# Patient Record
Sex: FEMALE | Race: WHITE | NOT HISPANIC OR LATINO | URBAN - METROPOLITAN AREA
[De-identification: names, ages, dates, MRNs, and addresses within clinical notes are randomized per-mention and may not be internally consistent; named-entity substitution may affect disease eponyms.]

---

## 2021-11-08 ENCOUNTER — *OTHER (OUTPATIENT)
Dept: URBAN - METROPOLITAN AREA CLINIC 24 | Facility: CLINIC | Age: 76
Setting detail: DERMATOLOGY
End: 2021-11-08

## 2021-11-08 ENCOUNTER — RX ONLY (RX ONLY)
Age: 76
End: 2021-11-08

## 2021-11-08 DIAGNOSIS — L70.0 ACNE VULGARIS: ICD-10-CM

## 2021-11-08 PROCEDURE — 99213 OFFICE O/P EST LOW 20 MIN: CPT

## 2021-11-08 RX ORDER — KETOCONAZOLE 20 MG/G
1 LIBERALLY CREAM TOPICAL TWICE A DAY
Qty: 60 | Refills: 1
Start: 2021-11-08

## 2021-11-08 RX ORDER — CICLOPIROX OLAMINE 7.7 MG/100ML
1 A SMALL AMOUNT SUSPENSION TOPICAL AT BEDTIME
Qty: 60 | Refills: 10
Start: 2021-11-08

## 2021-11-18 ENCOUNTER — RX ONLY (RX ONLY)
Age: 76
End: 2021-11-18

## 2021-11-18 RX ORDER — KETOCONAZOLE 20 MG/G
1 AS DIRECTED CREAM TOPICAL AS DIRECTED
Qty: 60 | Refills: 6
Start: 2021-11-18

## 2021-11-22 ENCOUNTER — RX ONLY (RX ONLY)
Age: 76
End: 2021-11-22

## 2021-11-22 RX ORDER — KETOCONAZOLE 20 MG/G
1 A SMALL AMOUNT CREAM TOPICAL TWICE A DAY
Qty: 60 | Refills: 3
Start: 2021-11-22

## 2024-02-27 ENCOUNTER — APPOINTMENT (OUTPATIENT)
Dept: URBAN - METROPOLITAN AREA CLINIC 193 | Age: 79
Setting detail: DERMATOLOGY
End: 2024-02-29

## 2024-02-27 DIAGNOSIS — L81.1 CHLOASMA: ICD-10-CM

## 2024-02-27 PROCEDURE — OTHER MIPS QUALITY: OTHER

## 2024-02-27 PROCEDURE — OTHER SKIN MEDICINALS: OTHER

## 2024-02-27 PROCEDURE — OTHER COUNSELING: OTHER

## 2024-02-27 PROCEDURE — 99213 OFFICE O/P EST LOW 20 MIN: CPT

## 2024-02-27 PROCEDURE — OTHER PRESCRIPTION MEDICATION MANAGEMENT: OTHER

## 2024-02-27 ASSESSMENT — LOCATION DETAILED DESCRIPTION DERM
LOCATION DETAILED: LEFT INFERIOR CENTRAL MALAR CHEEK
LOCATION DETAILED: RIGHT INFERIOR CENTRAL MALAR CHEEK

## 2024-02-27 ASSESSMENT — LOCATION SIMPLE DESCRIPTION DERM
LOCATION SIMPLE: RIGHT CHEEK
LOCATION SIMPLE: LEFT CHEEK

## 2024-02-27 ASSESSMENT — LOCATION ZONE DERM: LOCATION ZONE: FACE

## 2024-02-27 NOTE — HPI: MELASMA (PATIENT REPORTED)
Where Is Your Melasma Located?: Face
Additional Comments (Use Complete Sentences): Patient present today with melisma on her face. Treating with HQ8%/Vit C/KA6%.

## 2024-02-27 NOTE — PROCEDURE: [facility]
Sig: Apply pea sized amount per area at night
Sig: Apply a thin layer to the affected areas twice daily
Sig: Wash affected areas daily.
Sig: Apply a thin layer to the affected skin twice daily
Sig: Apply to affected areas twice daily
Sig: Apply to affected areas on face twice daily
Intro Statement: I recommended the following products:
Sig: Apply a thin layer to the areas with decreased hair density 1-2 times daily
Sig: Apply a thin layer to the affected nails daily
Sig: Take one twice daily
Sig: Use as directed when washing hair
Sig: Apply to the affected skin twice daily
Sig: Apply nightly to warts nightly under occlusion
Sig: Take one pill daily
Sig: Apply twice daily for 5 days
Sig: Take one pill twice daily
Sig: Apply a thin layer to the affected areas daily
Sig: Apply a thin layer to the itching areas twice daily as needed
Lightening Cream Prescription 1: Hydroquinone 12%, Kojic Acid 6%, Vitamin C 1%, Niacinamide 2% Cream
Sig: Apply a thin layer to the scar daily
Detail Level: Simple
Product Type (1): Lightening Cream

## 2024-02-27 NOTE — PROCEDURE: PRESCRIPTION MEDICATION MANAGEMENT
Render In Strict Bullet Format?: No
Detail Level: Detailed
Plan: Prescription sent to [facility].